# Patient Record
Sex: FEMALE | Race: WHITE
[De-identification: names, ages, dates, MRNs, and addresses within clinical notes are randomized per-mention and may not be internally consistent; named-entity substitution may affect disease eponyms.]

---

## 2017-04-06 ENCOUNTER — HOSPITAL ENCOUNTER (EMERGENCY)
Dept: HOSPITAL 74 - JERFT | Age: 77
Discharge: HOME | End: 2017-04-06
Payer: COMMERCIAL

## 2017-04-06 VITALS — SYSTOLIC BLOOD PRESSURE: 128 MMHG | HEART RATE: 60 BPM | DIASTOLIC BLOOD PRESSURE: 83 MMHG | TEMPERATURE: 98.4 F

## 2017-04-06 VITALS — BODY MASS INDEX: 26.5 KG/M2

## 2017-04-06 DIAGNOSIS — Z95.5: ICD-10-CM
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
## 2017-04-06 DIAGNOSIS — Z85.3: ICD-10-CM

## 2017-04-06 DIAGNOSIS — E78.00: ICD-10-CM

## 2017-04-06 DIAGNOSIS — I10: ICD-10-CM

## 2017-04-06 DIAGNOSIS — E03.9: ICD-10-CM
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
## 2017-04-06 DIAGNOSIS — I25.10: ICD-10-CM
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
## 2017-04-06 LAB
APPEARANCE UR: CLEAR
BILIRUB UR STRIP.AUTO-MCNC: NEGATIVE MG/DL
COLOR UR: (no result)
KETONES UR QL STRIP: NEGATIVE
LEUKOCYTE ESTERASE UR QL STRIP.AUTO: (no result)
MUCOUS THREADS URNS QL MICRO: (no result)
NITRITE UR QL STRIP: NEGATIVE
PH UR: 6 [PH] (ref 5–8)
PROT UR QL STRIP: NEGATIVE
PROT UR QL STRIP: NEGATIVE
RBC # BLD AUTO: 4 /HPF (ref 0–3)
RBC # UR STRIP: (no result) /UL
SP GR UR: 1.01 (ref 1–1.03)
UROBILINOGEN UR STRIP-MCNC: NEGATIVE E.U./DL (ref 0.2–1)
WBC # UR AUTO: 7 /HPF (ref 3–5)

## 2017-04-06 PROCEDURE — 3E0233Z INTRODUCTION OF ANTI-INFLAMMATORY INTO MUSCLE, PERCUTANEOUS APPROACH: ICD-10-PCS

## 2017-04-06 NOTE — PDOC
History of Present Illness





- General


Chief Complaint: Back Pain


Stated Complaint: BACK PAIN


Time Seen by Provider: 04/06/17 19:30


History Source: Patient


Exam Limitations: No Limitations





- History of Present Illness


Initial Comments: 


04/06/17 19:40


c/o back pain onset this morning. States was twisting yesterday and may have 

pulled a muscle, however has history of kidney stones in with patient was 

uncertain as to whether was related. States urine has been a darker yellow 

however not foul-smelling nor was there any blood. States the pain that she's 

having is not similar to the pain she had 3 years ago. Took Aleve yesterday 

with minimal resolved. Denies fever, nausea vomiting, any other related illness.





04/06/17 19:58





Occurred: reports: just prior to arrival


Severity: reports: mild, moderate


Pain Location: reports: back


Method of Injury: Yes: unknown


Modifying Factors: improves with: None


Loss of Consciousness: no loss of consciousness


Associated Symptoms (Fall): denies symptoms





Past History





- Travel


Traveled outside of the country in the last 30 days: No


Close contact w/someone who was outside of country & ill: No





- Past Medical History


Allergies/Adverse Reactions: 


 Allergies











Allergy/AdvReac Type Severity Reaction Status Date / Time


 


lisinopril Allergy   Verified 04/06/17 18:45











Home Medications: 


Ambulatory Orders





Bimatoprost [Lumigan] 1 drop OU HS 07/31/12 


Hydrochlorothiazide [Hctz] 12.5 mg PO DAILY 07/31/12 


Multivitamin [Multivitamins] 1 each PO DAILY 07/31/12 


Pilocarpine 2% [Pilostat 2% -] 1 drop OD BID 12/20/14 


Atorvastatin Ca [Lipitor] 20 mg PO HS  tablet 07/19/15 


Cholecalciferol (Vitamin D3) [Vitamin D3 -] 2,000 unit PO DAILY  tab 07/19/15 


Dorzolamide HCl [Trusopt 2% -] 1 drop OU BID  drops 07/19/15 


Latanoprost 0.005% Eye Drops [Xalatan 0.005% Eye Drops -] 1 drop OU HS  drops 07

/19/15 


Levothyroxine [Synthroid -] 25 mcg PO DAILY@0700  tablet 07/19/15 


Metoprolol Succinate [Toprol XL -] 50 mg PO DAILY  tab.sr.24h 07/19/15 


Timolol 0.5% [Timoptic 0.5%] 1 drop OU BID  drops 07/19/15 


Aspirin [Ecotrin] 81 mg PO ASDIR 04/08/16 


Aspirin Coated [Ecotrin -] 81 mg PO DAILY  tablet.ec 04/12/16 


Atorvastatin Ca [Lipitor] 20 mg PO HS  tablet 04/12/16 


Levothyroxine [Synthroid -] 25 mcg PO DAILY@0700  tablet 04/12/16 


Metoprolol Succinate [Toprol XL -] 25 mg PO DAILY  tab.sr.24h 04/12/16 


Pantoprazole Sodium [Protonix -] 40 mg PO DAILY #30 tablet.ec 04/12/16 


Cyclobenzaprine HCl [Flexeril 10 mg] 10 mg PO BID PRN #14 tablet 04/06/17 


Nitrofurantoin Macrocrystal [Macrodantin -] 100 mg PO BID #14 capsule 04/06/17 








Anemia: No


Asthma: No


Cancer: Yes (LEFT BREAST CA 2007)


Cardiac Disorders: Yes (STENT)


CVA: No


COPD: No


CHF: No


Dementia: No


Diabetes: No


GI Disorders: No


 Disorders: No


HTN: Yes


Hypercholesterolemia: Yes


Liver Disease: No


Seizures: No


Thyroid Disease: Yes (HYPO)





- Surgical History


Abdominal Surgery: Yes


Appendectomy: Yes


Cardiac Surgery: No


Cholecystectomy: No


Neurologic Surgery: No


Orthopedic Surgery: Yes





- Immunization History


Immunization Up to Date: Yes





- Psycho/Social/Smoking Cessation Hx


Anxiety: No


Suicidal Ideation: No


Smoking Status: No


Smoking History: Former smoker


Have you smoked in the past 12 months: No


Number of Cigarettes Smoked Daily: 0


If you are a former smoker, when did you quit?: 1975


Information on smoking cessation initiated: No


Hx Alcohol Use: Yes (OCCASIONAL GLASS OF WINE)


Drug/Substance Use Hx: No


Substance Use Type: None


Hx Substance Use Treatment: No





**Review of Systems





- Review of Systems


Able to Perform ROS?: Yes


Is the patient limited English proficient: Yes


Constitutional: Yes: Symptoms Reported, See HPI, Malaise.  No: Chills, Fever, 

Loss of Appetite


HEENTM: Yes: See HPI.  No: Symptoms Reported


Respiratory: No: Symptoms reported


Musculoskeletal: Yes: Symptoms Reported, See HPI, Back Pain, Muscle Pain, 

Muscle Weakness


Integumentary: Yes: Symptoms Reported


All Other Systems: Reviewed and Negative





*Physical Exam





- Vital Signs


 Last Vital Signs











Temp Pulse Resp BP Pulse Ox


 


 98.4 F   60   18   128/83   95 


 


 04/06/17 18:41  04/06/17 18:41  04/06/17 18:41  04/06/17 18:41  04/06/17 18:41














- Physical Exam


General Appearance: Yes: Nourished, Appropriately Dressed, Apparent Distress


HEENT: positive: GRANT, Normal ENT Inspection


Neck: positive: Supple.  negative: Tender


Respiratory/Chest: positive: Lungs Clear, Normal Breath Sounds


Cardiovascular: positive: Regular Rate


Gastrointestinal/Abdominal: positive: Soft.  negative: Tender


Musculoskeletal: positive: Normal Inspection, Decreased Range of Motion, Muscle 

Spasm (palpable spasm noted to the right paravertebral spinous muscles 

primarily lower thoracic up to lumbar area. Has no spine tenderness, range of 

motion is difficult secondary to acute onset of tenderness without movement and 

twisting.).  negative: CVA Tenderness, Vertebral Tenderness


Extremity: positive: Normal Capillary Refill, Normal Range of Motion


Integumentary: positive: Normal Color, Dry, Warm, Rash


Neurologic: positive: CNs II-XII NML intact, Fully Oriented, Alert, Normal Mood/

Affect





Progress Note





- Progress Note


Progress Note: 


Back spasm with early symptoms of UTI., will treat with NSAIDs and 

cyclobenzaprine and started on Macrobid for UTI.





*DC/Admit/Observation/Transfer


Diagnosis at time of Disposition: 


UTI (urinary tract infection)


Qualifiers:


 Urinary tract infection type: acute cystitis Hematuria presence: with 

hematuria Qualified Code(s): N30.01 - Acute cystitis with hematuria





Low back strain


Qualifiers:


 Encounter type: initial encounter Qualified Code(s): S39.012A - Strain of 

muscle, fascia and tendon of lower back, initial encounter





- Discharge Dispostion


Disposition: HOME


Condition at time of disposition: Stable


Admit: No





- Prescriptions


Prescriptions: 


Cyclobenzaprine HCl [Flexeril 10 mg] 10 mg PO BID PRN #14 tablet


 PRN Reason: spasm


Nitrofurantoin Macrocrystal [Macrodantin -] 100 mg PO BID #14 capsule





- Referrals


Referrals: 


Jose Ramon Lovelace MD [Primary Care Provider] - 





- Patient Instructions


Printed Discharge Instructions:  DI for Urinary Tract Infection (UTI), DI for 

Back Strain or Sprain


Additional Instructions: 


Rest, drink lots of fluids: Teas, water, soups





Avoid contact with others until fevers and symptoms resolved


Lots of handwashing and good hygiene





Continue over-the-counter medications for symptomatic relief


Tylenol or Motrin for fever and pain


Continue all of antibiotics until completed


Followup with private physician in one week for repeat urinalysis/reevaluation





Return to emergency department for worsened symptoms, fevers, dehydration





Rest, no heavy lifting or exercise until pain is resolved


Hot soaks to neck and low back as often as possible/hot showers or Jacuzzis


No massage or therapy until spasm is gone





Continue Aleve 1  tablets every 6 hours for the next 3 days then as needed for 

pain and swelling


Cyclobenzaprine 1-10mg every 8 hours as needed for spasm


If not significant improvement within 24 hours with medication and rest regime, 

followup with private physician for change in medications and /or therapy.








- Post Discharge Activity


Work/School Note:  Back to Work

## 2020-09-11 ENCOUNTER — HOSPITAL ENCOUNTER (OUTPATIENT)
Dept: HOSPITAL 74 - JASU-SURG | Age: 80
Discharge: HOME | End: 2020-09-11
Attending: UROLOGY
Payer: COMMERCIAL

## 2020-09-11 VITALS — BODY MASS INDEX: 26.2 KG/M2

## 2020-09-11 VITALS — DIASTOLIC BLOOD PRESSURE: 80 MMHG | HEART RATE: 58 BPM | SYSTOLIC BLOOD PRESSURE: 150 MMHG

## 2020-09-11 VITALS — TEMPERATURE: 97.8 F

## 2020-09-11 DIAGNOSIS — N36.2: Primary | ICD-10-CM

## 2020-09-11 DIAGNOSIS — N35.92: ICD-10-CM

## 2020-09-11 PROCEDURE — 0TBD0ZZ EXCISION OF URETHRA, OPEN APPROACH: ICD-10-PCS | Performed by: UROLOGY

## 2020-09-11 NOTE — OP
Operative Note





- Note:


Operative Date: 09/11/20


Pre-Operative Diagnosis: urethral stricture and caruncle


Operation: distal urethroplasty


Post-Operative Diagnosis: Same as Pre-op


Surgeon: Aida Miranda


Anesthesia: General


Specimens Removed: distal urethra


Estimated Blood Loss (mls): 0


Instrument used (Debridements only): 0


Drains & Tubes with Location: 20f 10cc barahona


Drains, Volume Out (mls): 0


Blood Volume Replaced (mls): 0


Fluid Volume Replaced (mls): 0


Operative Report Dictated: Yes

## 2020-09-11 NOTE — OP
Operative Note





- Note:


Operative Date: 09/11/20


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


Operation: distal urethroplasty


Findings: 





1cm distal urethral caruncle and distal urethral stricture


Post-Operative Diagnosis: Same as Pre-op


Surgeon: Aida Miranda


Anesthesia: General


Specimens Removed: distal urethra and caruncle


Estimated Blood Loss (mls): 20


Instrument used (Debridements only): 0


Drains & Tubes with Location: 20f 10cc barahona


Drains, Volume Out (mls): 0


Blood Volume Replaced (mls): 0


Fluid Volume Replaced (mls): 0


Operative Report Dictated: Yes

## 2020-09-11 NOTE — HP
DATE OF ADMISSION:  09/11/2020

 

DATE OF PROCEDURE:  09/11/2020

 

HISTORY OF PRESENT ILLNESS:  The patient is an 78-year-old female with history of

recurrent urinary tract infection including frequency, dysuria, urgency, and

dyspareunia.  She is G2, P2, postmenopausal.  She has been treated with Cipro,

Keflex, Macrobid, and Pyridium without any help.  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

genital herpes.  She underwent a left lumpectomy for localized cancer.  She also has

history of skin cancer.  She denies diabetes, high blood pressure or cardiac disease.

 

 

PHYSICAL EXAMINATION:  Revealed atrophic vaginitis with a large purple pedunculated

caruncle extending outside the meatus.  There was also scarring and dryness of the

meatus.  Her James test is positive.  

 

LABORATORY:  Her urine is positive for blood.  Her urine cytology was negative for

neoplasm.  

 

PLAN:  Patient will undergo urethral dilation and excision of urethral lesion. 

Patient on further questioning reveals that she is on lisinopril, Synthroid, Toprol

and Lipitor.  Will continue with procedure.  

 

 

AYDEN [id number]

DD: 09/11/2020 13:08

DT: 09/11/2020 19:45

Job #:  [id number]

## 2020-09-12 NOTE — OP
DATE OF OPERATION:  09/11/2020

 

PREOPERATIVE DIAGNOSIS:  Urethral stricture disease and urethral caruncle.  

 

POSTOPERATIVE DIAGNOSIS:  Urethral stricture disease and urethral caruncle.

 

OPERATIVE PROCEDURE:  Distal urethroplasty, excision of caruncle, and urethral

dilation.  

 

ANESTHESIA:  General.

 

DESCRIPTION OF PROCEDURE:  Under above-stated anesthesia, patient is prepped and

draped in the usual sterile manner.  She is placed in the dorsal lithotomy position. 

The distal urethra was excised using a circumferential incision.  The entire urethra

including the caruncle was excised.  The edges of the midurethra were debrided using

both blunt and sharp dissection.  The midurethra was advanced to the meatus at the

vestibule of the vaginal opening and suture ligated using 5-0 Vicryl suture

ligatures.  No active bleeding was noted.  A Davila catheter was placed and connected

to a leg bag.  Vaginal packing was also placed.  The patient tolerated the procedure

well.  She returned to the recovery room in good condition.  

 

 

AYDEN [id number]

DD: 09/11/2020 14:14

DT: 09/12/2020 13:29

Job #:  [id number]

## 2020-09-15 NOTE — PATH
Surgical Pathology Report



Patient Name:  JOVANNY PALACIOS

Accession #:  [id number]

[facility]. Rec. #:  [id number]                                                        

   /Age/Gender:  1940 (Age: 80) / F

Account:  [id number]                                                          

             Location: [facility] SURGICAL

Taken:  2020

Received:  2020

Reported:  9/15/2020

Physicians:  Aida Miranda M.D.

  



Specimen(s) Received

 URETHRAL CARUNCLE 





Clinical History

Urethral caruncle







Final Diagnosis

URETHRAL CARUNCLE, EXCISION:

CONSISTENT WITH URETHRAL CARUNCLE.





***Electronically Signed***

Isaiah Robles M.D.





Gross Description

Received in formalin, labeled "urethral caruncle" is a tan, irregular portion of

soft tissue measuring 0.5 cm. in greatest dimension. The specimen is submitted

in toto in one cassette.

## 2021-06-07 ENCOUNTER — HOSPITAL ENCOUNTER (EMERGENCY)
Dept: HOSPITAL 74 - JER | Age: 81
Discharge: HOME | End: 2021-06-07
Payer: COMMERCIAL

## 2021-06-07 VITALS — TEMPERATURE: 97.4 F

## 2021-06-07 VITALS — BODY MASS INDEX: 26.2 KG/M2

## 2021-06-07 VITALS — HEART RATE: 74 BPM | SYSTOLIC BLOOD PRESSURE: 126 MMHG | DIASTOLIC BLOOD PRESSURE: 74 MMHG

## 2021-06-07 DIAGNOSIS — R10.31: Primary | ICD-10-CM

## 2021-06-07 LAB
ALBUMIN SERPL-MCNC: 3.9 G/DL (ref 3.4–5)
ALP SERPL-CCNC: 101 U/L (ref 45–117)
ALT SERPL-CCNC: 12 U/L (ref 13–61)
ANION GAP SERPL CALC-SCNC: 8 MMOL/L (ref 8–16)
APPEARANCE UR: CLEAR
APTT BLD: 35.2 SECONDS (ref 25.2–36.5)
AST SERPL-CCNC: 24 U/L (ref 15–37)
BACTERIA # UR AUTO: 76 /UL (ref 0–1359)
BASOPHILS # BLD: 0.5 % (ref 0–2)
BILIRUB SERPL-MCNC: 0.9 MG/DL (ref 0.2–1)
BILIRUB UR STRIP.AUTO-MCNC: NEGATIVE MG/DL
BUN SERPL-MCNC: 10.5 MG/DL (ref 7–18)
CALCIUM SERPL-MCNC: 9.4 MG/DL (ref 8.5–10.1)
CASTS URNS QL MICRO: 0 /UL (ref 0–3.1)
CHLORIDE SERPL-SCNC: 102 MMOL/L (ref 98–107)
CO2 SERPL-SCNC: 28 MMOL/L (ref 21–32)
COLOR UR: YELLOW
CREAT SERPL-MCNC: 0.7 MG/DL (ref 0.55–1.3)
DEPRECATED RDW RBC AUTO: 13.7 % (ref 11.6–15.6)
EOSINOPHIL # BLD: 2.4 % (ref 0–4.5)
EPITH CASTS URNS QL MICRO: 9 /UL (ref 0–25.1)
GLUCOSE SERPL-MCNC: 89 MG/DL (ref 74–106)
HCT VFR BLD CALC: 43.3 % (ref 32.4–45.2)
HGB BLD-MCNC: 14.6 GM/DL (ref 10.7–15.3)
INR BLD: 0.97 (ref 0.83–1.09)
KETONES UR QL STRIP: NEGATIVE
LEUKOCYTE ESTERASE UR QL STRIP.AUTO: (no result)
LIPASE SERPL-CCNC: 105 U/L (ref 73–393)
LYMPHOCYTES # BLD: 27.2 % (ref 8–40)
MCH RBC QN AUTO: 31 PG (ref 25.7–33.7)
MCHC RBC AUTO-ENTMCNC: 33.8 G/DL (ref 32–36)
MCV RBC: 91.9 FL (ref 80–96)
MONOCYTES # BLD AUTO: 9.2 % (ref 3.8–10.2)
NEUTROPHILS # BLD: 60.7 % (ref 42.8–82.8)
NITRITE UR QL STRIP: NEGATIVE
PH UR: 5.5 [PH] (ref 5–8)
PLATELET # BLD AUTO: 205 K/MM3 (ref 134–434)
PMV BLD: 9.3 FL (ref 7.5–11.1)
PROT SERPL-MCNC: 7.5 G/DL (ref 6.4–8.2)
PROT UR QL STRIP: NEGATIVE
PROT UR QL STRIP: NEGATIVE
PT PNL PPP: 11.9 SEC (ref 9.7–13)
RBC # BLD AUTO: 3 /UL (ref 0–23.9)
RBC # BLD AUTO: 4.71 M/MM3 (ref 3.6–5.2)
SODIUM SERPL-SCNC: 138 MMOL/L (ref 136–145)
SP GR UR: 1.01 (ref 1.01–1.03)
UROBILINOGEN UR STRIP-MCNC: 0.2 MG/DL (ref 0.2–1)
WBC # BLD AUTO: 6.6 K/MM3 (ref 4–10)
WBC # UR AUTO: 20 /UL (ref 0–25.8)

## 2021-06-07 PROCEDURE — 3E0333Z INTRODUCTION OF ANTI-INFLAMMATORY INTO PERIPHERAL VEIN, PERCUTANEOUS APPROACH: ICD-10-PCS | Performed by: STUDENT IN AN ORGANIZED HEALTH CARE EDUCATION/TRAINING PROGRAM

## 2021-06-07 PROCEDURE — 3E03329 INTRODUCTION OF OTHER ANTI-INFECTIVE INTO PERIPHERAL VEIN, PERCUTANEOUS APPROACH: ICD-10-PCS | Performed by: STUDENT IN AN ORGANIZED HEALTH CARE EDUCATION/TRAINING PROGRAM

## 2021-08-12 ENCOUNTER — HOSPITAL ENCOUNTER (OUTPATIENT)
Dept: HOSPITAL 74 - JASU-ENDO | Age: 81
Discharge: HOME | End: 2021-08-12
Attending: INTERNAL MEDICINE
Payer: COMMERCIAL

## 2021-08-12 VITALS — BODY MASS INDEX: 26.6 KG/M2

## 2021-08-12 VITALS — HEART RATE: 61 BPM | DIASTOLIC BLOOD PRESSURE: 55 MMHG | SYSTOLIC BLOOD PRESSURE: 118 MMHG

## 2021-08-12 VITALS — TEMPERATURE: 97.3 F

## 2021-08-12 DIAGNOSIS — K29.50: ICD-10-CM

## 2021-08-12 DIAGNOSIS — K64.4: ICD-10-CM

## 2021-08-12 DIAGNOSIS — Z12.11: Primary | ICD-10-CM

## 2021-08-12 DIAGNOSIS — K57.30: ICD-10-CM

## 2021-08-12 DIAGNOSIS — Z86.010: ICD-10-CM

## 2021-08-12 PROCEDURE — 0DB68ZX EXCISION OF STOMACH, VIA NATURAL OR ARTIFICIAL OPENING ENDOSCOPIC, DIAGNOSTIC: ICD-10-PCS | Performed by: INTERNAL MEDICINE

## 2021-08-12 PROCEDURE — 0DJD8ZZ INSPECTION OF LOWER INTESTINAL TRACT, VIA NATURAL OR ARTIFICIAL OPENING ENDOSCOPIC: ICD-10-PCS | Performed by: INTERNAL MEDICINE

## 2021-08-12 PROCEDURE — 43239 EGD BIOPSY SINGLE/MULTIPLE: CPT

## 2021-12-28 ENCOUNTER — HOSPITAL ENCOUNTER (OUTPATIENT)
Dept: HOSPITAL 74 - JASU-SURG | Age: 81
Discharge: HOME | End: 2021-12-28
Attending: STUDENT IN AN ORGANIZED HEALTH CARE EDUCATION/TRAINING PROGRAM
Payer: COMMERCIAL

## 2021-12-28 VITALS — BODY MASS INDEX: 24.7 KG/M2

## 2021-12-28 VITALS — DIASTOLIC BLOOD PRESSURE: 64 MMHG | SYSTOLIC BLOOD PRESSURE: 138 MMHG | TEMPERATURE: 97.2 F | HEART RATE: 54 BPM

## 2021-12-28 DIAGNOSIS — M48.061: Primary | ICD-10-CM

## 2021-12-28 DIAGNOSIS — M54.16: ICD-10-CM

## 2021-12-28 PROCEDURE — B01BYZZ FLUOROSCOPY OF SPINAL CORD USING OTHER CONTRAST: ICD-10-PCS | Performed by: STUDENT IN AN ORGANIZED HEALTH CARE EDUCATION/TRAINING PROGRAM

## 2021-12-28 PROCEDURE — 3E0R33Z INTRODUCTION OF ANTI-INFLAMMATORY INTO SPINAL CANAL, PERCUTANEOUS APPROACH: ICD-10-PCS | Performed by: STUDENT IN AN ORGANIZED HEALTH CARE EDUCATION/TRAINING PROGRAM

## 2021-12-28 PROCEDURE — 3E0R3BZ INTRODUCTION OF ANESTHETIC AGENT INTO SPINAL CANAL, PERCUTANEOUS APPROACH: ICD-10-PCS | Performed by: STUDENT IN AN ORGANIZED HEALTH CARE EDUCATION/TRAINING PROGRAM

## 2022-08-19 ENCOUNTER — HOSPITAL ENCOUNTER (OUTPATIENT)
Dept: HOSPITAL 74 - JASU-SURG | Age: 82
Discharge: HOME | End: 2022-08-19
Attending: STUDENT IN AN ORGANIZED HEALTH CARE EDUCATION/TRAINING PROGRAM
Payer: COMMERCIAL

## 2022-08-19 VITALS — SYSTOLIC BLOOD PRESSURE: 130 MMHG | HEART RATE: 54 BPM | DIASTOLIC BLOOD PRESSURE: 60 MMHG | RESPIRATION RATE: 18 BRPM

## 2022-08-19 VITALS — TEMPERATURE: 97.7 F

## 2022-08-19 VITALS — BODY MASS INDEX: 24.7 KG/M2

## 2022-08-19 DIAGNOSIS — M25.511: ICD-10-CM

## 2022-08-19 DIAGNOSIS — G89.4: Primary | ICD-10-CM

## 2022-08-19 PROCEDURE — 64555 IMPLANT NEUROELECTRODES: CPT

## 2022-08-19 PROCEDURE — 01HY3MZ INSERTION OF NEUROSTIMULATOR LEAD INTO PERIPHERAL NERVE, PERCUTANEOUS APPROACH: ICD-10-PCS | Performed by: STUDENT IN AN ORGANIZED HEALTH CARE EDUCATION/TRAINING PROGRAM

## 2022-12-16 ENCOUNTER — OFFICE (OUTPATIENT)
Dept: URBAN - METROPOLITAN AREA CLINIC 121 | Facility: CLINIC | Age: 82
Setting detail: OPHTHALMOLOGY
End: 2022-12-16
Payer: MEDICARE

## 2022-12-16 ENCOUNTER — RX ONLY (RX ONLY)
Age: 82
End: 2022-12-16

## 2022-12-16 DIAGNOSIS — H35.3131: ICD-10-CM

## 2022-12-16 DIAGNOSIS — Z98.83: ICD-10-CM

## 2022-12-16 DIAGNOSIS — H40.1113: ICD-10-CM

## 2022-12-16 DIAGNOSIS — H35.54: ICD-10-CM

## 2022-12-16 DIAGNOSIS — H18.513: ICD-10-CM

## 2022-12-16 DIAGNOSIS — H40.033: ICD-10-CM

## 2022-12-16 DIAGNOSIS — H40.1123: ICD-10-CM

## 2022-12-16 DIAGNOSIS — H02.011: ICD-10-CM

## 2022-12-16 DIAGNOSIS — H35.363: ICD-10-CM

## 2022-12-16 DIAGNOSIS — H16.223: ICD-10-CM

## 2022-12-16 DIAGNOSIS — H35.40: ICD-10-CM

## 2022-12-16 PROCEDURE — 99213 OFFICE O/P EST LOW 20 MIN: CPT | Performed by: OPHTHALMOLOGY

## 2022-12-16 PROCEDURE — 92134 CPTRZ OPH DX IMG PST SGM RTA: CPT | Performed by: OPHTHALMOLOGY

## 2022-12-16 ASSESSMENT — SUPERFICIAL PUNCTATE KERATITIS (SPK)
OS_SPK: T
OD_SPK: 2+

## 2022-12-16 ASSESSMENT — PACHYMETRY
OS_CT_CORRECTION: 5
OD_CT_UM: 491
OD_CT_CORRECTION: 4
OS_CT_UM: 479

## 2022-12-16 ASSESSMENT — CORNEAL DYSTROPHY - POSTERIOR
OS_POSTERIORDYSTROPHY: 1+ GUTTATA
OD_POSTERIORDYSTROPHY: 1+ GUTTATA

## 2022-12-16 ASSESSMENT — CONFRONTATIONAL VISUAL FIELD TEST (CVF)
OD_FINDINGS: FULL
OS_FINDINGS: FULL

## 2022-12-16 ASSESSMENT — LID EXAM ASSESSMENTS: OD_TRICHIASIS: RUL

## 2022-12-16 ASSESSMENT — VISUAL ACUITY
OS_BCVA: CF
OD_BCVA: 20/40

## 2022-12-23 ENCOUNTER — OFFICE (OUTPATIENT)
Dept: URBAN - METROPOLITAN AREA CLINIC 121 | Facility: CLINIC | Age: 82
Setting detail: OPHTHALMOLOGY
End: 2022-12-23
Payer: MEDICARE

## 2022-12-23 DIAGNOSIS — H35.54: ICD-10-CM

## 2022-12-23 DIAGNOSIS — H40.1123: ICD-10-CM

## 2022-12-23 DIAGNOSIS — H35.363: ICD-10-CM

## 2022-12-23 DIAGNOSIS — H40.033: ICD-10-CM

## 2022-12-23 DIAGNOSIS — H16.223: ICD-10-CM

## 2022-12-23 DIAGNOSIS — H02.011: ICD-10-CM

## 2022-12-23 DIAGNOSIS — H40.1113: ICD-10-CM

## 2022-12-23 DIAGNOSIS — Z98.83: ICD-10-CM

## 2022-12-23 DIAGNOSIS — H18.513: ICD-10-CM

## 2022-12-23 DIAGNOSIS — H35.40: ICD-10-CM

## 2022-12-23 DIAGNOSIS — H35.3131: ICD-10-CM

## 2022-12-23 PROCEDURE — 92012 INTRM OPH EXAM EST PATIENT: CPT | Performed by: OPHTHALMOLOGY

## 2022-12-23 ASSESSMENT — CONFRONTATIONAL VISUAL FIELD TEST (CVF)
OD_FINDINGS: FULL
OS_FINDINGS: FULL

## 2022-12-23 ASSESSMENT — SUPERFICIAL PUNCTATE KERATITIS (SPK)
OD_SPK: 2+
OS_SPK: T

## 2022-12-23 ASSESSMENT — LID EXAM ASSESSMENTS: OD_TRICHIASIS: RUL

## 2022-12-23 ASSESSMENT — VISUAL ACUITY
OS_BCVA: CF
OD_BCVA: 20/25

## 2022-12-23 ASSESSMENT — PACHYMETRY
OD_CT_UM: 491
OS_CT_CORRECTION: 5
OD_CT_CORRECTION: 4
OS_CT_UM: 479

## 2022-12-23 ASSESSMENT — TONOMETRY: OS_IOP_MMHG: 10

## 2022-12-23 ASSESSMENT — CORNEAL DYSTROPHY - POSTERIOR
OS_POSTERIORDYSTROPHY: 1+ GUTTATA
OD_POSTERIORDYSTROPHY: 1+ GUTTATA

## 2023-01-06 ENCOUNTER — OFFICE (OUTPATIENT)
Dept: URBAN - METROPOLITAN AREA CLINIC 121 | Facility: CLINIC | Age: 83
Setting detail: OPHTHALMOLOGY
End: 2023-01-06
Payer: MEDICARE

## 2023-01-06 DIAGNOSIS — H40.1123: ICD-10-CM

## 2023-01-06 DIAGNOSIS — H35.363: ICD-10-CM

## 2023-01-06 DIAGNOSIS — H35.40: ICD-10-CM

## 2023-01-06 DIAGNOSIS — H18.513: ICD-10-CM

## 2023-01-06 DIAGNOSIS — H16.223: ICD-10-CM

## 2023-01-06 DIAGNOSIS — H40.1113: ICD-10-CM

## 2023-01-06 DIAGNOSIS — Z98.83: ICD-10-CM

## 2023-01-06 DIAGNOSIS — H35.54: ICD-10-CM

## 2023-01-06 DIAGNOSIS — H35.3131: ICD-10-CM

## 2023-01-06 DIAGNOSIS — H02.011: ICD-10-CM

## 2023-01-06 DIAGNOSIS — H40.033: ICD-10-CM

## 2023-01-06 PROCEDURE — 99213 OFFICE O/P EST LOW 20 MIN: CPT | Performed by: OPHTHALMOLOGY

## 2023-01-06 ASSESSMENT — CORNEAL DYSTROPHY - POSTERIOR
OS_POSTERIORDYSTROPHY: 1+ GUTTATA
OD_POSTERIORDYSTROPHY: 1+ GUTTATA

## 2023-01-06 ASSESSMENT — CONFRONTATIONAL VISUAL FIELD TEST (CVF)
OS_FINDINGS: FULL
OD_FINDINGS: FULL

## 2023-01-06 ASSESSMENT — TONOMETRY
OS_IOP_MMHG: 15
OD_IOP_MMHG: 19

## 2023-01-06 ASSESSMENT — PACHYMETRY
OS_CT_CORRECTION: 5
OS_CT_UM: 479
OD_CT_UM: 491
OD_CT_CORRECTION: 4

## 2023-01-06 ASSESSMENT — LID EXAM ASSESSMENTS: OD_TRICHIASIS: RUL

## 2023-01-06 ASSESSMENT — SUPERFICIAL PUNCTATE KERATITIS (SPK)
OD_SPK: 2+
OS_SPK: T

## 2023-01-12 ASSESSMENT — VISUAL ACUITY
OS_BCVA: CF
OD_BCVA: 20/25

## 2023-03-17 ENCOUNTER — OFFICE (OUTPATIENT)
Dept: URBAN - METROPOLITAN AREA CLINIC 121 | Facility: CLINIC | Age: 83
Setting detail: OPHTHALMOLOGY
End: 2023-03-17
Payer: MEDICARE

## 2023-03-17 DIAGNOSIS — H18.513: ICD-10-CM

## 2023-03-17 DIAGNOSIS — H35.363: ICD-10-CM

## 2023-03-17 DIAGNOSIS — Z98.83: ICD-10-CM

## 2023-03-17 DIAGNOSIS — H16.223: ICD-10-CM

## 2023-03-17 DIAGNOSIS — H40.033: ICD-10-CM

## 2023-03-17 DIAGNOSIS — H40.1113: ICD-10-CM

## 2023-03-17 DIAGNOSIS — H35.3131: ICD-10-CM

## 2023-03-17 DIAGNOSIS — H35.54: ICD-10-CM

## 2023-03-17 DIAGNOSIS — H02.011: ICD-10-CM

## 2023-03-17 DIAGNOSIS — H35.40: ICD-10-CM

## 2023-03-17 DIAGNOSIS — H40.1123: ICD-10-CM

## 2023-03-17 PROCEDURE — 92012 INTRM OPH EXAM EST PATIENT: CPT | Performed by: OPHTHALMOLOGY

## 2023-03-17 ASSESSMENT — SUPERFICIAL PUNCTATE KERATITIS (SPK)
OS_SPK: T
OD_SPK: 2+

## 2023-03-17 ASSESSMENT — CORNEAL DYSTROPHY - POSTERIOR
OS_POSTERIORDYSTROPHY: 1+ GUTTATA
OD_POSTERIORDYSTROPHY: 1+ GUTTATA

## 2023-03-17 ASSESSMENT — TONOMETRY: OD_IOP_MMHG: 10

## 2023-03-17 ASSESSMENT — LID EXAM ASSESSMENTS: OD_TRICHIASIS: RUL

## 2023-03-17 ASSESSMENT — PACHYMETRY
OD_CT_CORRECTION: 4
OS_CT_CORRECTION: 5
OD_CT_UM: 491
OS_CT_UM: 479

## 2023-03-17 ASSESSMENT — VISUAL ACUITY
OD_BCVA: 20/30
OS_BCVA: CF

## 2023-06-01 ENCOUNTER — HOSPITAL ENCOUNTER (EMERGENCY)
Dept: HOSPITAL 74 - JER | Age: 83
Discharge: HOME | End: 2023-06-01
Payer: COMMERCIAL

## 2023-06-01 VITALS
DIASTOLIC BLOOD PRESSURE: 75 MMHG | SYSTOLIC BLOOD PRESSURE: 145 MMHG | RESPIRATION RATE: 18 BRPM | TEMPERATURE: 98.1 F | HEART RATE: 65 BPM

## 2023-06-01 VITALS — BODY MASS INDEX: 26.2 KG/M2
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
## 2023-06-01 DIAGNOSIS — S01.411A: Primary | ICD-10-CM
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
## 2023-06-01 PROCEDURE — 0HQ1XZZ REPAIR FACE SKIN, EXTERNAL APPROACH: ICD-10-PCS

## 2023-07-21 ENCOUNTER — OFFICE (OUTPATIENT)
Dept: URBAN - METROPOLITAN AREA CLINIC 121 | Facility: CLINIC | Age: 83
Setting detail: OPHTHALMOLOGY
End: 2023-07-21
Payer: MEDICARE

## 2023-07-21 DIAGNOSIS — H40.1113: ICD-10-CM

## 2023-07-21 DIAGNOSIS — H18.513: ICD-10-CM

## 2023-07-21 DIAGNOSIS — H40.033: ICD-10-CM

## 2023-07-21 DIAGNOSIS — H40.1123: ICD-10-CM

## 2023-07-21 DIAGNOSIS — H35.3131: ICD-10-CM

## 2023-07-21 DIAGNOSIS — H35.40: ICD-10-CM

## 2023-07-21 DIAGNOSIS — H35.54: ICD-10-CM

## 2023-07-21 DIAGNOSIS — H16.223: ICD-10-CM

## 2023-07-21 DIAGNOSIS — Z98.83: ICD-10-CM

## 2023-07-21 DIAGNOSIS — H02.012: ICD-10-CM

## 2023-07-21 DIAGNOSIS — H02.015: ICD-10-CM

## 2023-07-21 DIAGNOSIS — H35.363: ICD-10-CM

## 2023-07-21 PROBLEM — H02.011: Status: ACTIVE | Noted: 2023-07-21

## 2023-07-21 PROBLEM — H02.014: Status: ACTIVE | Noted: 2023-07-21

## 2023-07-21 PROCEDURE — 92250 FUNDUS PHOTOGRAPHY W/I&R: CPT | Performed by: OPHTHALMOLOGY

## 2023-07-21 PROCEDURE — 99214 OFFICE O/P EST MOD 30 MIN: CPT | Performed by: OPHTHALMOLOGY

## 2023-07-21 ASSESSMENT — SUPERFICIAL PUNCTATE KERATITIS (SPK)
OD_SPK: 2+
OS_SPK: T

## 2023-07-21 ASSESSMENT — TONOMETRY
OS_IOP_MMHG: 10
OD_IOP_MMHG: 15

## 2023-07-21 ASSESSMENT — PACHYMETRY
OD_CT_CORRECTION: 4
OS_CT_UM: 479
OS_CT_CORRECTION: 5
OD_CT_UM: 491

## 2023-07-21 ASSESSMENT — CORNEAL DYSTROPHY - POSTERIOR
OD_POSTERIORDYSTROPHY: 1+ GUTTATA
OS_POSTERIORDYSTROPHY: 1+ GUTTATA

## 2023-07-21 ASSESSMENT — CONFRONTATIONAL VISUAL FIELD TEST (CVF)
OS_FINDINGS: FULL
OD_FINDINGS: FULL

## 2023-07-21 ASSESSMENT — LID EXAM ASSESSMENTS
OS_TRICHIASIS: LUL
OD_TRICHIASIS: RUL

## 2023-07-24 ASSESSMENT — VISUAL ACUITY
OS_BCVA: CF
OD_BCVA: 20/30

## 2023-07-24 ASSESSMENT — KERATOMETRY
OS_K1POWER_DIOPTERS: 42.25
OS_AXISANGLE_DEGREES: 014
OD_AXISANGLE_DEGREES: 172
OS_K2POWER_DIOPTERS: 44.00
OD_K2POWER_DIOPTERS: 43.50
OD_K1POWER_DIOPTERS: 41.75

## 2023-07-24 ASSESSMENT — REFRACTION_AUTOREFRACTION
OS_SPHERE: ERROR
OD_SPHERE: ERROR

## 2023-10-09 ENCOUNTER — RX ONLY (RX ONLY)
Age: 83
End: 2023-10-09

## 2023-10-09 ENCOUNTER — OFFICE (OUTPATIENT)
Dept: URBAN - METROPOLITAN AREA CLINIC 121 | Facility: CLINIC | Age: 83
Setting detail: OPHTHALMOLOGY
End: 2023-10-09
Payer: MEDICARE

## 2023-10-09 DIAGNOSIS — H40.1123: ICD-10-CM

## 2023-10-09 DIAGNOSIS — H40.1113: ICD-10-CM

## 2023-10-09 PROCEDURE — 92083 EXTENDED VISUAL FIELD XM: CPT | Performed by: OPHTHALMOLOGY

## 2023-10-09 PROCEDURE — 92133 CPTRZD OPH DX IMG PST SGM ON: CPT | Performed by: OPHTHALMOLOGY

## 2023-10-09 PROCEDURE — 99212 OFFICE O/P EST SF 10 MIN: CPT | Performed by: OPHTHALMOLOGY

## 2023-10-09 ASSESSMENT — PACHYMETRY
OS_CT_CORRECTION: 5
OD_CT_CORRECTION: 4
OS_CT_UM: 479
OD_CT_UM: 491

## 2023-10-09 ASSESSMENT — SUPERFICIAL PUNCTATE KERATITIS (SPK)
OS_SPK: T
OD_SPK: 2+

## 2023-10-09 ASSESSMENT — TONOMETRY
OD_IOP_MMHG: 11
OS_IOP_MMHG: 09

## 2023-10-09 ASSESSMENT — CONFRONTATIONAL VISUAL FIELD TEST (CVF)
OS_FINDINGS: FULL
OD_FINDINGS: FULL

## 2023-10-09 ASSESSMENT — LID EXAM ASSESSMENTS
OS_TRICHIASIS: LUL
OD_TRICHIASIS: RUL

## 2023-10-09 ASSESSMENT — CORNEAL DYSTROPHY - POSTERIOR
OS_POSTERIORDYSTROPHY: 1+ GUTTATA
OD_POSTERIORDYSTROPHY: 1+ GUTTATA

## 2023-10-10 ASSESSMENT — KERATOMETRY
OS_AXISANGLE_DEGREES: 014
OD_K2POWER_DIOPTERS: 43.50
OD_AXISANGLE_DEGREES: 172
OD_K1POWER_DIOPTERS: 41.75
OS_K2POWER_DIOPTERS: 44.00
OS_K1POWER_DIOPTERS: 42.25

## 2023-10-10 ASSESSMENT — REFRACTION_AUTOREFRACTION
OD_SPHERE: ERROR
OS_SPHERE: ERROR

## 2023-10-10 ASSESSMENT — VISUAL ACUITY
OD_BCVA: 20/30
OS_BCVA: CF

## 2023-12-19 ENCOUNTER — OFFICE (OUTPATIENT)
Dept: URBAN - METROPOLITAN AREA CLINIC 121 | Facility: CLINIC | Age: 83
Setting detail: OPHTHALMOLOGY
End: 2023-12-19
Payer: MEDICARE

## 2023-12-19 DIAGNOSIS — H40.1113: ICD-10-CM

## 2023-12-19 DIAGNOSIS — H40.033: ICD-10-CM

## 2023-12-19 DIAGNOSIS — H16.223: ICD-10-CM

## 2023-12-19 DIAGNOSIS — H40.1123: ICD-10-CM

## 2023-12-19 DIAGNOSIS — H35.54: ICD-10-CM

## 2023-12-19 DIAGNOSIS — Z98.83: ICD-10-CM

## 2023-12-19 DIAGNOSIS — H02.014: ICD-10-CM

## 2023-12-19 DIAGNOSIS — H02.011: ICD-10-CM

## 2023-12-19 DIAGNOSIS — H18.513: ICD-10-CM

## 2023-12-19 DIAGNOSIS — H35.40: ICD-10-CM

## 2023-12-19 DIAGNOSIS — H35.3131: ICD-10-CM

## 2023-12-19 DIAGNOSIS — H35.363: ICD-10-CM

## 2023-12-19 PROCEDURE — 99213 OFFICE O/P EST LOW 20 MIN: CPT | Performed by: OPHTHALMOLOGY

## 2023-12-19 ASSESSMENT — REFRACTION_AUTOREFRACTION
OD_SPHERE: ERROR
OS_SPHERE: ERROR

## 2023-12-19 ASSESSMENT — CORNEAL DYSTROPHY - POSTERIOR
OS_POSTERIORDYSTROPHY: 1+ GUTTATA
OD_POSTERIORDYSTROPHY: 1+ GUTTATA

## 2023-12-19 ASSESSMENT — CONFRONTATIONAL VISUAL FIELD TEST (CVF)
OS_FINDINGS: FULL
OD_FINDINGS: FULL

## 2023-12-19 ASSESSMENT — LID EXAM ASSESSMENTS
OS_TRICHIASIS: LUL
OD_TRICHIASIS: RUL

## 2023-12-19 ASSESSMENT — SUPERFICIAL PUNCTATE KERATITIS (SPK)
OD_SPK: 2+
OS_SPK: T

## 2024-03-22 ENCOUNTER — OFFICE (OUTPATIENT)
Dept: URBAN - METROPOLITAN AREA CLINIC 121 | Facility: CLINIC | Age: 84
Setting detail: OPHTHALMOLOGY
End: 2024-03-22
Payer: MEDICARE

## 2024-03-22 DIAGNOSIS — H40.1113: ICD-10-CM

## 2024-03-22 PROCEDURE — 99212 OFFICE O/P EST SF 10 MIN: CPT | Performed by: OPHTHALMOLOGY

## 2024-03-22 PROCEDURE — 92133 CPTRZD OPH DX IMG PST SGM ON: CPT | Performed by: OPHTHALMOLOGY

## 2024-03-22 PROCEDURE — 92083 EXTENDED VISUAL FIELD XM: CPT | Performed by: OPHTHALMOLOGY

## 2024-03-22 ASSESSMENT — LID EXAM ASSESSMENTS
OS_TRICHIASIS: LUL
OD_TRICHIASIS: RUL

## 2024-07-01 ENCOUNTER — OFFICE (OUTPATIENT)
Dept: URBAN - METROPOLITAN AREA CLINIC 121 | Facility: CLINIC | Age: 84
Setting detail: OPHTHALMOLOGY
End: 2024-07-01
Payer: MEDICARE

## 2024-07-01 DIAGNOSIS — H35.54: ICD-10-CM

## 2024-07-01 DIAGNOSIS — H18.513: ICD-10-CM

## 2024-07-01 DIAGNOSIS — H16.223: ICD-10-CM

## 2024-07-01 DIAGNOSIS — Z98.83: ICD-10-CM

## 2024-07-01 DIAGNOSIS — H40.1123: ICD-10-CM

## 2024-07-01 DIAGNOSIS — H02.014: ICD-10-CM

## 2024-07-01 DIAGNOSIS — H35.40: ICD-10-CM

## 2024-07-01 DIAGNOSIS — H35.3131: ICD-10-CM

## 2024-07-01 DIAGNOSIS — H40.033: ICD-10-CM

## 2024-07-01 DIAGNOSIS — H40.1113: ICD-10-CM

## 2024-07-01 DIAGNOSIS — H35.363: ICD-10-CM

## 2024-07-01 DIAGNOSIS — H02.011: ICD-10-CM

## 2024-07-01 PROCEDURE — 92132 CPTRZD OPH DX IMG ANT SGM: CPT | Performed by: OPHTHALMOLOGY

## 2024-07-01 PROCEDURE — 99213 OFFICE O/P EST LOW 20 MIN: CPT | Performed by: OPHTHALMOLOGY

## 2024-07-01 ASSESSMENT — CONFRONTATIONAL VISUAL FIELD TEST (CVF)
OD_FINDINGS: FULL
OS_FINDINGS: FULL

## 2024-07-01 ASSESSMENT — LID EXAM ASSESSMENTS
OD_TRICHIASIS: RUL
OS_TRICHIASIS: LUL

## 2024-09-11 ENCOUNTER — OFFICE (OUTPATIENT)
Dept: URBAN - METROPOLITAN AREA CLINIC 121 | Facility: CLINIC | Age: 84
Setting detail: OPHTHALMOLOGY
End: 2024-09-11
Payer: MEDICARE

## 2024-09-11 DIAGNOSIS — H40.1113: ICD-10-CM

## 2024-09-11 DIAGNOSIS — H35.363: ICD-10-CM

## 2024-09-11 DIAGNOSIS — H16.223: ICD-10-CM

## 2024-09-11 DIAGNOSIS — H35.3131: ICD-10-CM

## 2024-09-11 DIAGNOSIS — H40.1123: ICD-10-CM

## 2024-09-11 DIAGNOSIS — H35.54: ICD-10-CM

## 2024-09-11 DIAGNOSIS — H40.033: ICD-10-CM

## 2024-09-11 DIAGNOSIS — H18.513: ICD-10-CM

## 2024-09-11 DIAGNOSIS — H02.014: ICD-10-CM

## 2024-09-11 DIAGNOSIS — Z98.83: ICD-10-CM

## 2024-09-11 DIAGNOSIS — H35.40: ICD-10-CM

## 2024-09-11 DIAGNOSIS — H02.011: ICD-10-CM

## 2024-09-11 PROCEDURE — 92012 INTRM OPH EXAM EST PATIENT: CPT | Performed by: OPHTHALMOLOGY

## 2024-09-11 ASSESSMENT — CONFRONTATIONAL VISUAL FIELD TEST (CVF)
OD_FINDINGS: FULL
OS_FINDINGS: FULL

## 2024-09-11 ASSESSMENT — LID EXAM ASSESSMENTS
OS_TRICHIASIS: LUL
OD_TRICHIASIS: RUL

## 2024-10-02 ENCOUNTER — HOSPITAL ENCOUNTER (OUTPATIENT)
Dept: HOSPITAL 74 - JRADUS-SUR | Age: 84
Discharge: HOME | End: 2024-10-02
Attending: INTERNAL MEDICINE
Payer: COMMERCIAL

## 2024-10-02 DIAGNOSIS — N63.22: Primary | ICD-10-CM

## 2024-10-02 PROCEDURE — 0H9U3ZX DRAINAGE OF LEFT BREAST, PERCUTANEOUS APPROACH, DIAGNOSTIC: ICD-10-PCS

## 2024-10-02 PROCEDURE — A4648 IMPLANTABLE TISSUE MARKER: HCPCS

## 2024-12-13 ENCOUNTER — OFFICE (OUTPATIENT)
Facility: LOCATION | Age: 84
Setting detail: OPHTHALMOLOGY
End: 2024-12-13
Payer: MEDICARE

## 2024-12-13 DIAGNOSIS — H02.014: ICD-10-CM

## 2024-12-13 DIAGNOSIS — H18.513: ICD-10-CM

## 2024-12-13 DIAGNOSIS — H35.54: ICD-10-CM

## 2024-12-13 DIAGNOSIS — H35.363: ICD-10-CM

## 2024-12-13 DIAGNOSIS — H35.40: ICD-10-CM

## 2024-12-13 DIAGNOSIS — H40.1123: ICD-10-CM

## 2024-12-13 DIAGNOSIS — H40.1113: ICD-10-CM

## 2024-12-13 DIAGNOSIS — Z98.83: ICD-10-CM

## 2024-12-13 DIAGNOSIS — H16.223: ICD-10-CM

## 2024-12-13 DIAGNOSIS — H35.3131: ICD-10-CM

## 2024-12-13 DIAGNOSIS — H02.011: ICD-10-CM

## 2024-12-13 DIAGNOSIS — H40.033: ICD-10-CM

## 2024-12-13 PROCEDURE — 92012 INTRM OPH EXAM EST PATIENT: CPT | Performed by: OPHTHALMOLOGY

## 2024-12-13 ASSESSMENT — CORNEAL DYSTROPHY - POSTERIOR
OS_POSTERIORDYSTROPHY: 1+ GUTTATA
OD_POSTERIORDYSTROPHY: 1+ GUTTATA

## 2024-12-13 ASSESSMENT — PACHYMETRY
OS_CT_CORRECTION: 5
OD_CT_UM: 491
OS_CT_UM: 479
OD_CT_CORRECTION: 4

## 2024-12-13 ASSESSMENT — KERATOMETRY
OS_K2POWER_DIOPTERS: 44.00
OS_AXISANGLE_DEGREES: 014
OD_K2POWER_DIOPTERS: 43.50
OS_K1POWER_DIOPTERS: 42.25
OD_K1POWER_DIOPTERS: 41.75
OD_AXISANGLE_DEGREES: 172

## 2024-12-13 ASSESSMENT — CONFRONTATIONAL VISUAL FIELD TEST (CVF)
OD_FINDINGS: FULL
OS_FINDINGS: FULL

## 2024-12-13 ASSESSMENT — VISUAL ACUITY
OS_BCVA: CF
OD_BCVA: 20/25

## 2024-12-13 ASSESSMENT — LID EXAM ASSESSMENTS
OD_TRICHIASIS: RUL
OS_TRICHIASIS: LUL

## 2024-12-13 ASSESSMENT — SUPERFICIAL PUNCTATE KERATITIS (SPK)
OD_SPK: 2+
OS_SPK: T

## 2024-12-13 ASSESSMENT — REFRACTION_AUTOREFRACTION
OD_SPHERE: ERROR
OS_SPHERE: ERROR

## 2024-12-13 ASSESSMENT — TONOMETRY
OD_IOP_MMHG: 12
OS_IOP_MMHG: 11

## 2025-03-06 ENCOUNTER — HOSPITAL ENCOUNTER (EMERGENCY)
Dept: HOSPITAL 74 - JER | Age: 85
Discharge: HOME | End: 2025-03-06
Payer: COMMERCIAL

## 2025-03-06 VITALS
HEART RATE: 64 BPM | SYSTOLIC BLOOD PRESSURE: 149 MMHG | TEMPERATURE: 97.9 F | RESPIRATION RATE: 18 BRPM | DIASTOLIC BLOOD PRESSURE: 66 MMHG

## 2025-03-06 VITALS — BODY MASS INDEX: 33.2 KG/M2
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
## 2025-03-06 DIAGNOSIS — S00.81XA: Primary | ICD-10-CM

## 2025-03-06 LAB
ALBUMIN SERPL-MCNC: 4.1 G/DL (ref 3.4–5)
ALP SERPL-CCNC: 101 U/L (ref 45–117)
ALT SERPL-CCNC: 12 U/L (ref 13–61)
ANION GAP SERPL CALC-SCNC: 6 MMOL/L (ref 4–13)
AST SERPL-CCNC: 27 U/L (ref 15–37)
BILIRUB SERPL-MCNC: 0.5 MG/DL (ref 0.2–1)
BUN SERPL-MCNC: 16 MG/DL (ref 7–18)
CALCIUM SERPL-MCNC: 9.5 MG/DL (ref 8.5–10.1)
CHLORIDE SERPL-SCNC: 105 MMOL/L (ref 98–107)
CO2 SERPL-SCNC: 29 MMOL/L (ref 21–32)
CREAT SERPL-MCNC: 0.8 MG/DL (ref 0.55–1.3)
DEPRECATED RDW RBC AUTO: 14 % (ref 11.6–15.6)
GLUCOSE SERPL-MCNC: 109 MG/DL (ref 74–106)
HCT VFR BLD CALC: 43.2 % (ref 32.4–45.2)
HGB BLD-MCNC: 14 GM/DL (ref 10.7–15.3)
MAGNESIUM SERPL-MCNC: 2.3 MG/DL (ref 1.8–2.4)
MCH RBC QN AUTO: 29.5 PG (ref 25.7–33.7)
MCHC RBC AUTO-ENTMCNC: 32.5 G/DL (ref 32–36)
MCV RBC: 91 FL (ref 80–96)
PLATELET # BLD AUTO: 229 10^3/UL (ref 134–434)
PMV BLD: 8.5 FL (ref 7.5–11.1)
POTASSIUM SERPLBLD-SCNC: 3.9 MMOL/L (ref 3.5–5.1)
PROT SERPL-MCNC: 7.8 G/DL (ref 6.4–8.2)
RBC # BLD AUTO: 4.75 M/MM3 (ref 3.6–5.2)
SODIUM SERPL-SCNC: 140 MMOL/L (ref 136–145)
WBC # BLD AUTO: 9.5 K/MM3 (ref 4–10)

## 2025-03-06 RX ADMIN — ACETAMINOPHEN ONE MG: 325 TABLET ORAL at 17:56

## 2025-03-06 RX ADMIN — IBUPROFEN ONE: 400 TABLET, FILM COATED ORAL at 17:58

## 2025-05-27 ENCOUNTER — OFFICE (OUTPATIENT)
Facility: LOCATION | Age: 85
Setting detail: OPHTHALMOLOGY
End: 2025-05-27
Payer: MEDICARE

## 2025-05-27 DIAGNOSIS — H35.3131: ICD-10-CM

## 2025-05-27 DIAGNOSIS — H16.223: ICD-10-CM

## 2025-05-27 DIAGNOSIS — H35.363: ICD-10-CM

## 2025-05-27 DIAGNOSIS — Z98.83: ICD-10-CM

## 2025-05-27 DIAGNOSIS — H40.033: ICD-10-CM

## 2025-05-27 DIAGNOSIS — H35.40: ICD-10-CM

## 2025-05-27 DIAGNOSIS — H35.54: ICD-10-CM

## 2025-05-27 DIAGNOSIS — H18.513: ICD-10-CM

## 2025-05-27 DIAGNOSIS — H40.1123: ICD-10-CM

## 2025-05-27 DIAGNOSIS — H02.014: ICD-10-CM

## 2025-05-27 DIAGNOSIS — H40.1113: ICD-10-CM

## 2025-05-27 DIAGNOSIS — H02.011: ICD-10-CM

## 2025-05-27 PROCEDURE — 92014 COMPRE OPH EXAM EST PT 1/>: CPT | Performed by: OPHTHALMOLOGY

## 2025-05-27 ASSESSMENT — LID EXAM ASSESSMENTS
OS_TRICHIASIS: LUL
OD_TRICHIASIS: RUL

## 2025-05-27 ASSESSMENT — REFRACTION_AUTOREFRACTION
OD_SPHERE: ERROR
OS_SPHERE: ERROR

## 2025-05-27 ASSESSMENT — CONFRONTATIONAL VISUAL FIELD TEST (CVF)
OS_FINDINGS: FULL
OD_FINDINGS: FULL

## 2025-05-27 ASSESSMENT — PACHYMETRY
OD_CT_UM: 491
OS_CT_UM: 479
OS_CT_CORRECTION: 5
OD_CT_CORRECTION: 4

## 2025-05-27 ASSESSMENT — KERATOMETRY
OD_K1POWER_DIOPTERS: 41.75
OS_AXISANGLE_DEGREES: 014
OD_AXISANGLE_DEGREES: 172
OS_K1POWER_DIOPTERS: 42.25
OD_K2POWER_DIOPTERS: 43.50
OS_K2POWER_DIOPTERS: 44.00

## 2025-05-27 ASSESSMENT — SUPERFICIAL PUNCTATE KERATITIS (SPK)
OS_SPK: T
OD_SPK: T

## 2025-05-27 ASSESSMENT — TONOMETRY
OS_IOP_MMHG: 12
OD_IOP_MMHG: 13

## 2025-05-27 ASSESSMENT — VISUAL ACUITY
OD_BCVA: 20/25
OS_BCVA: CF

## 2025-05-27 ASSESSMENT — CORNEAL DYSTROPHY - POSTERIOR
OD_POSTERIORDYSTROPHY: 1+ GUTTATA
OS_POSTERIORDYSTROPHY: 1+ GUTTATA